# Patient Record
Sex: FEMALE | Race: WHITE
[De-identification: names, ages, dates, MRNs, and addresses within clinical notes are randomized per-mention and may not be internally consistent; named-entity substitution may affect disease eponyms.]

---

## 2021-07-01 ENCOUNTER — HOSPITAL ENCOUNTER (EMERGENCY)
Dept: HOSPITAL 97 - ER | Age: 26
Discharge: HOME | End: 2021-07-01
Payer: SELF-PAY

## 2021-07-01 VITALS — SYSTOLIC BLOOD PRESSURE: 110 MMHG | DIASTOLIC BLOOD PRESSURE: 64 MMHG

## 2021-07-01 VITALS — OXYGEN SATURATION: 100 % | TEMPERATURE: 98.9 F

## 2021-07-01 DIAGNOSIS — R10.819: ICD-10-CM

## 2021-07-01 DIAGNOSIS — R19.7: ICD-10-CM

## 2021-07-01 DIAGNOSIS — F17.210: ICD-10-CM

## 2021-07-01 DIAGNOSIS — E87.6: Primary | ICD-10-CM

## 2021-07-01 LAB
ALBUMIN SERPL BCP-MCNC: 3.5 G/DL (ref 3.4–5)
ALP SERPL-CCNC: 69 U/L (ref 45–117)
ALT SERPL W P-5'-P-CCNC: 47 U/L (ref 12–78)
AST SERPL W P-5'-P-CCNC: 31 U/L (ref 15–37)
BLD SMEAR INTERP: (no result)
BUN BLD-MCNC: 18 MG/DL (ref 7–18)
GLUCOSE SERPLBLD-MCNC: 102 MG/DL (ref 74–106)
HCT VFR BLD CALC: 42.5 % (ref 36–45)
LIPASE SERPL-CCNC: 39 U/L (ref 73–393)
LYMPHOCYTES # SPEC AUTO: 0.4 K/UL (ref 0.7–4.9)
MORPHOLOGY BLD-IMP: (no result)
PMV BLD: 10.9 FL (ref 7.6–11.3)
POTASSIUM SERPL-SCNC: 3.2 MMOL/L (ref 3.5–5.1)
RBC # BLD: 5.11 M/UL (ref 3.86–4.86)

## 2021-07-01 PROCEDURE — 80076 HEPATIC FUNCTION PANEL: CPT

## 2021-07-01 PROCEDURE — 96374 THER/PROPH/DIAG INJ IV PUSH: CPT

## 2021-07-01 PROCEDURE — 99284 EMERGENCY DEPT VISIT MOD MDM: CPT

## 2021-07-01 PROCEDURE — 81003 URINALYSIS AUTO W/O SCOPE: CPT

## 2021-07-01 PROCEDURE — 96375 TX/PRO/DX INJ NEW DRUG ADDON: CPT

## 2021-07-01 PROCEDURE — 36415 COLL VENOUS BLD VENIPUNCTURE: CPT

## 2021-07-01 PROCEDURE — 83690 ASSAY OF LIPASE: CPT

## 2021-07-01 PROCEDURE — 85025 COMPLETE CBC W/AUTO DIFF WBC: CPT

## 2021-07-01 PROCEDURE — 80048 BASIC METABOLIC PNL TOTAL CA: CPT

## 2021-07-01 NOTE — ER
Nurse's Notes                                                                                     

 [facility]                                                                 

Name: Dianne Peralta                                                                             

Age: 26 yrs                                                                                       

Sex: Female                                                                                       

: 1995                                                                                   

MRN: [id number]                                                                                   

Arrival Date: 2021                                                                          

Time: 16:26                                                                                       

Account#: [id number]                                                                            

Bed 18                                                                                            

Private MD:                                                                                       

Diagnosis: Abdominal tenderness;Vomiting;Diarrhea, unspecified;Hypokalemia                        

                                                                                                  

Presentation:                                                                                     

                                                                                             

16:30 Chief complaint: Patient states: N/V/D since 0300, also reports lower abdominal pain,   ph  

      denies fever or chills. Coronavirus screen: Client denies travel out of the [place] in the     

      last 14 days. At this time, the client does not indicate any symptoms associated with       

      coronavirus-19. Ebola Screen: No symptoms or risks identified at this time. Initial         

      Sepsis Screen: Does the patient meet any 2 criteria? No. Patient's initial sepsis           

      screen is negative. Does the patient have a suspected source of infection? No.              

      Patient's initial sepsis screen is negative. Risk Assessment: Do you want to hurt           

      yourself or someone else? Patient reports no desire to harm self or others. Onset of        

      symptoms was 2021.                                                                 

16:30 Method Of Arrival: Wheelchair                                                           ph  

16:30 Acuity: KEVIN 3                                                                           ph  

                                                                                                  

Historical:                                                                                       

- Allergies:                                                                                      

16:32 No Known Allergies;                                                                     ph  

- PMHx:                                                                                           

16:32 Anemia;                                                                                 ph  

                                                                                                  

- Immunization history:: Client reports having NOT received the Covid vaccine.                    

- Social history:: Smoking status: Patient reports the use of cigarette tobacco                   

  products, smokes one pack cigarettes per day.                                                   

                                                                                                  

                                                                                                  

Screenin:55 Abuse screen: Denies threats or abuse. Nutritional screening: No deficits noted.        rb3 

      Tuberculosis screening: No symptoms or risk factors identified. Fall Risk None              

      identified.                                                                                 

                                                                                                  

Assessment:                                                                                       

17:55 General: Appears in no apparent distress. Behavior is calm, cooperative, Denies fever.  rb3 

      Pain: Complains of pain in lower abdomen Pain currently is 7 out of 10 on a pain scale.     

      Pain began 0. Neuro: Level of Consciousness is awake, alert, obeys commands,             

      Oriented to person, place, time, situation. Cardiovascular: Patient's skin is warm and      

      dry. Respiratory: Airway is patent Respiratory effort is even, unlabored, Respiratory       

      pattern is regular, symmetrical. GI: Reports diarrhea, nausea, vomiting. : No signs       

      and/or symptoms were reported regarding the genitourinary system.                           

18:40 Reassessment: Patient appears in no apparent distress at this time. No changes from     rb3 

      previously documented assessment.                                                           

                                                                                                  

Vital Signs:                                                                                      

16:30  / 66; Pulse 103; Resp 18; Temp 98.9; Pulse Ox 100% on R/A; Weight 74.84 kg;      ph  

      Height 5 ft. 0 in. (152.40 cm);                                                             

20:01  / 64; Pulse 92; Resp 18; Pulse Ox 100% on R/A;                                   ak2 

16:30 Body Mass Index 32.22 (74.84 kg, 152.40 cm)                                             ph  

                                                                                                  

ED Course:                                                                                        

16:26 Patient arrived in ED.                                                                  ph  

16:32 Triage completed.                                                                       ph  

16:32 Arm band placed on Patient placed in waiting room.                                      ph  

17:52 Omkar Bravo MD is Attending Physician.                                             be 

17:55 Patient has correct armband on for positive identification. Bed in low position. Call   rb3 

      light in reach. Side rails up X 1. Pulse ox on. NIBP on.                                    

18:11 Irene Mercer, RN is Primary Nurse.                                                   rb3 

18:20 Inserted saline lock: 18 gauge in right antecubital area, using aseptic technique.      tr6 

      Blood collected.                                                                            

20:02 IV discontinued.                                                                        ak2 

                                                                                                  

Administered Medications:                                                                         

16:37 Drug: Ondansetron 4 mg Route: PO;                                                       ph  

17:05 Follow up: Response: No adverse reaction                                                rb3 

18:20 Drug: NS 0.9% 1000 ml Route: IV; Rate: 1 bolus; Site: right antecubital;                tr6 

19:10 Drug: Pepcid (famotidine) 20 mg Route: IVP; Site: right antecubital;                    tr6 

19:10 Drug: morphine 2 mg Route: IVP; Site: right antecubital;                                tr6 

19:34 Drug: Potassium Effervescent Tablet 25 mEq Route: PO;                                   ak2 

19:51 Drug: NS 0.9% 1000 ml Route: IV; Rate: 1 bolus; Site: right antecubital;                ak2 

                                                                                                  

                                                                                                  

Outcome:                                                                                          

18:37 Discharge ordered by MD.                                                                be 

19:05 Discharge ordered by MD.                                                                be 

20:02 Discharged to home ambulatory.                                                          ak2 

20:02 Condition: good                                                                             

20:02 Discharge instructions given to patient, family, Prescriptions given X                      

20:10 Patient left the ED.                                                                    ak2 

                                                                                                  

Signatures:                                                                                       

Omkar Bravo MD MD cha Hall, Patricia, RN                      RN   ph                                                   

Ruslan, Irene, RN                     RN   rb3                                                  

Angelica Steven, RN                   RN   tr6                                                  

Isaiah Cleaning2                                                  

                                                                                                  

**************************************************************************************************
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
## 2021-07-01 NOTE — EDPHYS
Physician Documentation                                                                           

 [facility]                                                                 

Name: Dianne Peralta                                                                             

Age: 26 yrs                                                                                       

Sex: Female                                                                                       

: 1995                                                                                   

MRN: [id number]                                                                                   

Arrival Date: 2021                                                                          

Time: 16:26                                                                                       

Account#: [id number]                                                                            

Bed 18                                                                                            

Private MD:                                                                                       

ED Physician Omkar Bravo                                                                      

HPI:                                                                                              

                                                                                             

18:29 This 26 yrs old  Female presents to ER via Wheelchair with complaints of       be 

      Nausea/Vomiting/Diarrhea.                                                                   

18:29 The patient presents to the emergency department with nausea, vomiting, that is         be 

      continuous.                                                                                 

18:30 Onset: The symptoms/episode began/occurred 1 day(s) ago. Possible causes: unknown. The  be 

      symptoms are aggravated by nothing. The symptoms are alleviated by remaining still.         

      Associated signs and symptoms: The patient has no apparent associated signs or              

      symptoms. Severity of symptoms: At their worst the symptoms were mild moderate in the       

      emergency department the symptoms are unchanged. The patient has not experienced            

      similar symptoms in the past.                                                               

                                                                                                  

Historical:                                                                                       

- Allergies:                                                                                      

16:32 No Known Allergies;                                                                     ph  

- PMHx:                                                                                           

16:32 Anemia;                                                                                 ph  

                                                                                                  

- Immunization history:: Client reports having NOT received the Covid vaccine.                    

- Social history:: Smoking status: Patient reports the use of cigarette tobacco                   

  products, smokes one pack cigarettes per day.                                                   

                                                                                                  

                                                                                                  

ROS:                                                                                              

18:33 Constitutional: Negative for fever, chills, and weight loss, Eyes: Negative for injury, be 

      pain, redness, and discharge, ENT: Negative for injury, pain, and discharge, Neck:          

      Negative for injury, pain, and swelling, Cardiovascular: Negative for chest pain,           

      palpitations, and edema, Respiratory: Negative for shortness of breath, cough,              

      wheezing, and pleuritic chest pain, Back: Negative for injury and pain, : Negative        

      for injury, bleeding, discharge, and swelling, MS/Extremity: Negative for injury and        

      deformity, Skin: Negative for injury, rash, and discoloration, Neuro: Negative for          

      headache, weakness, numbness, tingling, and seizure, Psych: Negative for depression,        

      anxiety, suicide ideation, homicidal ideation, and hallucinations, Allergy/Immunology:      

      Negative for hives, rash, and allergies, Endocrine: Negative for neck swelling,             

      polydipsia, polyuria, polyphagia, and marked weight changes, Hematologic/Lymphatic:         

      Negative for swollen nodes, abnormal bleeding, and unusual bruising.                        

18:33 Abdomen/GI: Positive for abdominal pain, nausea and vomiting, diarrhea, of the right        

      upper quadrant, left upper quadrant, right lower quadrant and left lower quadrant.          

                                                                                                  

Exam:                                                                                             

18:33 Constitutional:  This is a well developed, well nourished patient who is awake, alert,  be 

      and in no acute distress. Head/Face:  Normocephalic, atraumatic. Eyes:  Pupils equal        

      round and reactive to light, extra-ocular motions intact.  Lids and lashes normal.          

      Conjunctiva and sclera are non-icteric and not injected.  Cornea within normal limits.      

      Periorbital areas with no swelling, redness, or edema. ENT:  Nares patent. No nasal         

      discharge, no septal abnormalities noted.  Tympanic membranes are normal and external       

      auditory canals are clear.  Oropharynx with no redness, swelling, or masses, exudates,      

      or evidence of obstruction, uvula midline.  Mucous membranes moist. Neck:  Trachea          

      midline, no thyromegaly or masses palpated, and no cervical lymphadenopathy.  Supple,       

      full range of motion without nuchal rigidity, or vertebral point tenderness.  No            

      Meningismus. Chest/axilla:  Normal chest wall appearance and motion.  Nontender with no     

      deformity.  No lesions are appreciated. Cardiovascular:  Regular rate and rhythm with a     

      normal S1 and S2.  No gallops, murmurs, or rubs.  Normal PMI, no JVD.  No pulse             

      deficits. Respiratory:  Lungs have equal breath sounds bilaterally, clear to                

      auscultation and percussion.  No rales, rhonchi or wheezes noted.  No increased work of     

      breathing, no retractions or nasal flaring. Back:  No spinal tenderness.  No                

      costovertebral tenderness.  Full range of motion. Pelvic Exam:  Normal external             

      genitalia.  Speculum exam with closed cervical os, no discharge or bleeding noted.          

      Bimanual exam with normal adnexa, no adnexal or cervical motion tenderness.  Normal         

      uterus. Female :  Normal external genitalia. Skin:  Warm, dry with normal turgor.         

      Normal color with no rashes, no lesions, and no evidence of cellulitis. MS/ Extremity:      

      Pulses equal, no cyanosis.  Neurovascular intact.  Full, normal range of motion. Neuro:     

       Awake and alert, GCS 15, oriented to person, place, time, and situation.  Cranial          

      nerves II-XII grossly intact.  Motor strength 5/5 in all extremities.  Sensory grossly      

      intact.  Cerebellar exam normal.  Normal gait. Psych:  Awake, alert, with orientation       

      to person, place and time.  Behavior, mood, and affect are within normal limits.            

18:33 Abdomen/GI: Inspection: abdomen appears normal, Bowel sounds: normal, Palpation:            

      nontender, in all quadrants, Liver: no appreciated palpable abnormalities, Hernia: not      

      appreciated.                                                                                

                                                                                                  

Vital Signs:                                                                                      

16:30  / 66; Pulse 103; Resp 18; Temp 98.9; Pulse Ox 100% on R/A; Weight 74.84 kg;      ph  

      Height 5 ft. 0 in. (152.40 cm);                                                             

20:01  / 64; Pulse 92; Resp 18; Pulse Ox 100% on R/A;                                   ak2 

16:30 Body Mass Index 32.22 (74.84 kg, 152.40 cm)                                             ph  

                                                                                                  

MDM:                                                                                              

17:52 Patient medically screened.                                                             [facility] 

18:34 Differential diagnosis: Nonspecific abd pain, gastritis, pancreatitis, viral            be 

      gastroenteritis, gastroenteritis, diverticulitis, Ectopic Pregnancy, gastritis,             

      gastroesophageal reflux disease, Hepatitis. Data reviewed: vital signs, nurses notes,       

      lab test result(s). Data interpreted: Pulse oximetry: on room air is 100 %. Test            

      interpretation: by ED physician or midlevel provider:. Counseling: I had a detailed         

      discussion with the patient and/or guardian regarding: the historical points, exam          

      findings, and any diagnostic results supporting the discharge/admit diagnosis, lab          

      results.                                                                                    

                                                                                                  

                                                                                             

17:55 Order name: Basic Metabolic Panel; Complete Time: 19:01                                 [facility] 

                                                                                             

17:55 Order name: CBC with Diff; Complete Time: 19:54                                         [facility] 

                                                                                             

17:55 Order name: Hepatic Function; Complete Time: 19:01                                      [facility] 

                                                                                             

17:55 Order name: Lipase; Complete Time: 19:01                                                [facility] 

                                                                                             

18:55 Order name: Urine Dipstick-Ancillary; Complete Time: 19:01                              [facility]

                                                                                             

17:55 Order name: IV Saline Lock; Complete Time: 18:19                                        [facility] 

                                                                                             

19:13 Order name: CBC Smear Scan; Complete Time: 19:54                                        [facility]

                                                                                             

17:55 Order name: Labs collected and sent; Complete Time: 18:20                               [facility] 

                                                                                             

17:55 Order name: Urine Dipstick-Ancillary (obtain specimen); Complete Time: 19:10            [facility] 

                                                                                             

17:55 Order name: Urine Pregnancy Test (obtain specimen); Complete Time: 19:10                be 

                                                                                                  

Administered Medications:                                                                         

16:37 Drug: Ondansetron 4 mg Route: PO;                                                       ph  

17:05 Follow up: Response: No adverse reaction                                                rb3 

18:20 Drug: NS 0.9% 1000 ml Route: IV; Rate: 1 bolus; Site: right antecubital;                tr6 

19:10 Drug: Pepcid (famotidine) 20 mg Route: IVP; Site: right antecubital;                    tr6 

19:10 Drug: morphine 2 mg Route: IVP; Site: right antecubital;                                tr6 

19:34 Drug: Potassium Effervescent Tablet 25 mEq Route: PO;                                   ak2 

19:51 Drug: NS 0.9% 1000 ml Route: IV; Rate: 1 bolus; Site: right antecubital;                ak2 

                                                                                                  

                                                                                                  

Disposition Summary:                                                                              

21 19:05                                                                                    

Discharge Ordered                                                                                 

      Location: Home(21 19:05)                                                          be 

      Problem: new(21 19:05)                                                            be 

      Symptoms: have improved(21 19:05)                                                 be 

      Condition: Stable(21 19:05)                                                       be 

      Diagnosis                                                                                   

        - Abdominal tenderness(21 19:05)                                                be 

        - Vomiting(21 19:05)                                                            be 

        - Diarrhea, unspecified(21 19:05)                                               be 

        - Hypokalemia                                                                         be 

      Followup:                                                                               be 

        - With: Private Physician                                                                  

        - When: 2 - 3 days                                                                         

        - Reason: Recheck today's complaints, Continuance of care, Re-evaluation by your           

      physician                                                                                   

      Discharge Instructions:                                                                     

        - Discharge Summary Sheet                                                             be 

        - Abdominal Pain, Adult                                                               be 

        - Food Choices to Help Relieve Diarrhea, Adult                                        be 

        - Diarrhea, Adult                                                                     be 

        - Nausea and Vomiting, Adult                                                          be 

        - Potassium Content of Foods                                                          be 

        - Nausea and Vomiting, Adult, Easy-to-Read                                            be 

        - Diarrhea, Adult, Easy-to-Read                                                       be 

        - Abdominal Pain, Adult, Easy-to-Read                                                 be 

        - Hypokalemia                                                                         be 

      Forms:                                                                                      

        - Medication Reconciliation Form                                                      be 

        - Thank You Letter                                                                    be 

        - Antibiotic Education                                                                be 

        - Prescription Opioid Use                                                             be 

      Prescriptions:                                                                              

        - Pepcid 20 mg Oral Tablet                                                                 

            - take 1 tablet by ORAL route every 12 hours for 10 days; 20 tablet; Refills: 0,  [facility] 

      Product Selection Permitted                                                                 

        - Zofran 4 mg Oral Tablet                                                                  

            - take 1 tablet by ORAL route every 12 hours As needed; 20 tablet; Refills: 0,    be 

      Product Selection Permitted                                                                 

        - dicyclomine 20 mg Oral Tablet                                                            

            - take 1 tablet by ORAL route 4 times per day; 20 tablet; Refills: 0, Product     [facility] 

      Selection Permitted                                                                         

Signatures:                                                                                       

Dispatcher [employer]                           Omkar Larios MD MD cha Hall, Patricia, RN                      RN                                                      

Angelica Steven RN                   RN   tr6                                                  

Isaiah Cleaning                             ak2                                                  

Irene Mercer                         RN   rb3                                                  

                                                                                                  

Corrections: (The following items were deleted from the chart)                                    

18:43 18:37 Home be                                                                          be 

18:43 18:37 new [facility]                                                                           be 

18:43 18:37 have improved be                                                                 be 

18:43 18:37 Stable be                                                                        be 

18:43 18:37 Vomiting be                                                                      be 

18:43 18:37 Diarrhea, unspecified be                                                         [facility] 

18:43 18:37 Abdominal tenderness be                                                          be 

                                                                                                  

**************************************************************************************************

## 2024-08-06 ENCOUNTER — HOSPITAL ENCOUNTER (EMERGENCY)
Dept: HOSPITAL 97 - ER | Age: 29
Discharge: HOME | End: 2024-08-06
Payer: COMMERCIAL

## 2024-08-06 VITALS — OXYGEN SATURATION: 100 % | SYSTOLIC BLOOD PRESSURE: 134 MMHG | DIASTOLIC BLOOD PRESSURE: 87 MMHG | TEMPERATURE: 97.4 F

## 2024-08-06 DIAGNOSIS — K08.89: Primary | ICD-10-CM

## 2024-08-06 PROCEDURE — 99284 EMERGENCY DEPT VISIT MOD MDM: CPT

## 2024-08-06 PROCEDURE — 96372 THER/PROPH/DIAG INJ SC/IM: CPT
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
## 2024-08-06 NOTE — EDPHYS
Physician Documentation                                                                           

 [facility]                                                                 

Name: Dianne Peralta                                                                             

Age: 29 yrs                                                                                       

Sex: Female                                                                                       

: 1995                                                                                   

MRN: [id number]                                                                                   

Arrival Date: 2024                                                                          

Time: 01:34                                                                                       

Account#: [id number]                                                                            

Bed 12                                                                                            

Private MD:                                                                                       

ED Physician Jose Preciado                                                                     

HPI:                                                                                              

                                                                                             

01:59 This 29 yrs old Female presents to ER via Ambulatory with complaints of Toothache.      rt  

01:59 Patient presents to the ED with right lower dental pain. Patient states that she had a  rt  

      dental fracture several years ago but started having pain at about 6. She took headache     

      medicine. States the pain rates to her jaw. Denies other acute complaints at this time,     

      symptoms are moderate in severity, no other aggravating or alleviating factors..            

                                                                                                  

OB/GYN:                                                                                           

01:47 LMP 2024, Pregnancy unknown                                                         jj7 

                                                                                                  

Historical:                                                                                       

- Allergies:                                                                                      

01:47 No Known Allergies;                                                                     jj7 

- PMHx:                                                                                           

01:47 Anemia;                                                                                 jj7 

- PSHx:                                                                                           

01:47 None;                                                                                   jj7 

                                                                                                  

- Immunization history:: Adult Immunizations not up to date, Client reports having NOT            

  received the Covid vaccine. Flu vaccine is not up to date.                                      

- Infectious Disease History:: Denies.                                                            

- Social history:: Smoking status: Patient/guardian denies using tobacco, the patient             

  reports quitting approximately 3 years ago, Patient/guardian denies using alcohol,              

  street drugs, IV drugs.                                                                         

- Family history:: not pertinent.                                                                 

                                                                                                  

                                                                                                  

ROS:                                                                                              

01:59 Constitutional: Negative for fever, chills, and weight loss, Cardiovascular: Negative   rt  

      for chest pain, palpitations, and edema, Respiratory: Negative for shortness of breath,     

      cough, wheezing, and pleuritic chest pain, Abdomen/GI: Negative for abdominal pain,         

      nausea, vomiting, diarrhea, and constipation, Skin: Negative for injury, rash, and          

      discoloration, Neuro: Negative for headache, weakness, numbness, tingling, and seizure,     

:59 ENT: Positive for dental pain, Negative for sore throat,                                    

                                                                                                  

Exam:                                                                                             

01:59 Constitutional:  This is a well developed, well nourished patient who is awake, alert,  rt  

      and in no acute distress. Head/Face:  Normocephalic, atraumatic. Chest/axilla:  Normal      

      chest wall appearance and motion.  Nontender with no deformity.  No lesions are             

      appreciated. Cardiovascular:  Regular rate and rhythm with a normal S1 and S2.  No          

      gallops, murmurs, or rubs.  Normal PMI, no JVD.  No pulse deficits. Respiratory:  Lungs     

      have equal breath sounds bilaterally, clear to auscultation and percussion.  No rales,      

      rhonchi or wheezes noted.  No increased work of breathing, no retractions or nasal          

      flaring. Abdomen/GI:  Soft, non-tender, with normal bowel sounds.  No distension or         

      tympany.  No guarding or rebound.  No evidence of tenderness throughout. Skin:  Warm,       

      dry with normal turgor.  Normal color with no rashes, no lesions, and no evidence of        

      cellulitis. MS/ Extremity:  Pulses equal, no cyanosis.  Neurovascular intact.  Full,        

      normal range of motion. Neuro:  Awake and alert, GCS 15, oriented to person, place,         

      time, and situation.  Cranial nerves II-XII grossly intact.  Motor strength 5/5 in all      

      extremities.  Sensory grossly intact.  Cerebellar exam normal.  Normal gait.                

01:59 ENT: Right inferior molar caries noted, no swelling, erythema, drainable abscess.           

                                                                                                  

Vital Signs:                                                                                      

01:43  / 87; Pulse 78; Resp 17; Temp 97.4; Pulse Ox 100% ; Weight 77.11 kg; Height 5    jj7 

      ft. 0 in. ; Pain 10/10;                                                                     

01:43 Body Mass Index 33.20 (77.11 kg, 152.4 cm)                                              jj7 

01:43 Pain Scale: Adult                                                                       jj7 

                                                                                                  

MDM:                                                                                              

01:53 Patient medically screened.                                                             rt  

01:59 Differential diagnosis: dental caries, Dental fracture. Data reviewed: vital signs,     rt  

      nurses notes. Test considered but Not performed: CT: No signs of Kye angina,             

      drainable abscess, CT scan not indicated. Counseling: I had a detailed discussion with      

      the patient and/or guardian regarding the historical points, exam findings, and any         

      diagnostic results supporting the discharge/admit diagnosis, the need for outpatient        

      follow up, to return to the emergency department if symptoms worsen or persist or if        

      there are any questions or concerns that arise at home.                                     

                                                                                                  

Administered Medications:                                                                         

02:18 Drug: Ketorolac IM 30 mg IM once Route: IM; Site: right deltoid;                        jj7 

02:26 Follow up: Response: No adverse reaction                                                jj7 

                                                                                                  

                                                                                                  

Disposition Summary:                                                                              

24 01:59                                                                                    

Discharge Ordered                                                                                 

 Notes:       Location: Home                                                                        
  rt

      Problem: new                                                                            rt  

      Symptoms: are unchanged                                                                 rt  

      Condition: Stable                                                                       rt  

      Diagnosis                                                                                   

        - Dental pain                                                                         rt  

      Followup:                                                                               rt  

        - With: Private Physician                                                                  

        - When: 2 - 3 days                                                                         

        - Reason:                                                                                  

      Discharge Instructions:                                                                     

        - Discharge Summary Sheet                                                             rt  

        - Dental Pain                                                                         rt  

      Forms:                                                                                      

        - Medication Reconciliation Form                                                      rt  

        - Antibiotic Education                                                                rt  

        - Prescription Opioid Use                                                             rt  

        - Patient Portal Instructions                                                         rt  

        - Leadership Thank You Letter                                                         rt  

      Prescriptions:                                                                              

        - Ultram 50 mg Oral Tablet                                                                 

            - take 1 tablet ORAL route every 6 hours As needed; 12 tablet; Refills: 0,        rt  

      Product Selection Permitted                                                                 

Signatures:                                                                                       

Lexx Arce RN                 RN   jj7                                                  

Jose Preciado MD MD   rt                                                   

                                                                                                  

**************************************************************************************************